# Patient Record
Sex: FEMALE | Race: WHITE | NOT HISPANIC OR LATINO | Employment: STUDENT | URBAN - METROPOLITAN AREA
[De-identification: names, ages, dates, MRNs, and addresses within clinical notes are randomized per-mention and may not be internally consistent; named-entity substitution may affect disease eponyms.]

---

## 2022-04-07 ENCOUNTER — HOSPITAL ENCOUNTER (EMERGENCY)
Facility: OTHER | Age: 22
Discharge: HOME | End: 2022-04-07
Attending: EMERGENCY MEDICINE
Payer: COMMERCIAL

## 2022-04-07 VITALS
OXYGEN SATURATION: 100 % | SYSTOLIC BLOOD PRESSURE: 110 MMHG | TEMPERATURE: 98 F | DIASTOLIC BLOOD PRESSURE: 68 MMHG | BODY MASS INDEX: 20.09 KG/M2 | RESPIRATION RATE: 16 BRPM | HEART RATE: 68 BPM | WEIGHT: 125 LBS | HEIGHT: 66 IN

## 2022-04-07 DIAGNOSIS — R55 SYNCOPE: ICD-10-CM

## 2022-04-07 DIAGNOSIS — R10.2 ACUTE PELVIC PAIN: ICD-10-CM

## 2022-04-07 DIAGNOSIS — N83.201 RIGHT OVARIAN CYST: Primary | ICD-10-CM

## 2022-04-07 DIAGNOSIS — R55 VASOVAGAL SYNCOPE: ICD-10-CM

## 2022-04-07 LAB
ALBUMIN SERPL BCP-MCNC: 4.5 G/DL (ref 3.5–5.2)
ALP SERPL-CCNC: 64 U/L (ref 55–135)
ALT SERPL W/O P-5'-P-CCNC: 13 U/L (ref 10–44)
ANION GAP SERPL CALC-SCNC: 12 MMOL/L (ref 8–16)
AST SERPL-CCNC: 26 U/L (ref 10–40)
B-HCG UR QL: NEGATIVE
BACTERIA #/AREA URNS HPF: ABNORMAL /HPF
BASOPHILS # BLD AUTO: 0.06 K/UL (ref 0–0.2)
BASOPHILS NFR BLD: 0.6 % (ref 0–1.9)
BILIRUB SERPL-MCNC: 0.7 MG/DL (ref 0.1–1)
BILIRUB UR QL STRIP: NEGATIVE
BUN SERPL-MCNC: 8 MG/DL (ref 6–20)
CALCIUM SERPL-MCNC: 9.7 MG/DL (ref 8.7–10.5)
CHLORIDE SERPL-SCNC: 105 MMOL/L (ref 95–110)
CLARITY UR: CLEAR
CO2 SERPL-SCNC: 20 MMOL/L (ref 23–29)
COLOR UR: YELLOW
CREAT SERPL-MCNC: 0.8 MG/DL (ref 0.5–1.4)
CTP QC/QA: YES
DIFFERENTIAL METHOD: NORMAL
EOSINOPHIL # BLD AUTO: 0.1 K/UL (ref 0–0.5)
EOSINOPHIL NFR BLD: 0.6 % (ref 0–8)
ERYTHROCYTE [DISTWIDTH] IN BLOOD BY AUTOMATED COUNT: 12.1 % (ref 11.5–14.5)
EST. GFR  (AFRICAN AMERICAN): >60 ML/MIN/1.73 M^2
EST. GFR  (NON AFRICAN AMERICAN): >60 ML/MIN/1.73 M^2
GLUCOSE SERPL-MCNC: 81 MG/DL (ref 70–110)
GLUCOSE UR QL STRIP: NEGATIVE
HCT VFR BLD AUTO: 44.2 % (ref 37–48.5)
HCV AB SERPL QL IA: NEGATIVE
HGB BLD-MCNC: 15.2 G/DL (ref 12–16)
HGB UR QL STRIP: NEGATIVE
HIV 1+2 AB+HIV1 P24 AG SERPL QL IA: NEGATIVE
IMM GRANULOCYTES # BLD AUTO: 0.01 K/UL (ref 0–0.04)
IMM GRANULOCYTES NFR BLD AUTO: 0.1 % (ref 0–0.5)
KETONES UR QL STRIP: NEGATIVE
LEUKOCYTE ESTERASE UR QL STRIP: ABNORMAL
LYMPHOCYTES # BLD AUTO: 2 K/UL (ref 1–4.8)
LYMPHOCYTES NFR BLD: 20.5 % (ref 18–48)
MCH RBC QN AUTO: 30.6 PG (ref 27–31)
MCHC RBC AUTO-ENTMCNC: 34.4 G/DL (ref 32–36)
MCV RBC AUTO: 89 FL (ref 82–98)
MICROSCOPIC COMMENT: ABNORMAL
MONOCYTES # BLD AUTO: 0.6 K/UL (ref 0.3–1)
MONOCYTES NFR BLD: 6 % (ref 4–15)
NEUTROPHILS # BLD AUTO: 6.9 K/UL (ref 1.8–7.7)
NEUTROPHILS NFR BLD: 72.2 % (ref 38–73)
NITRITE UR QL STRIP: NEGATIVE
NRBC BLD-RTO: 0 /100 WBC
PH UR STRIP: 8 [PH] (ref 5–8)
PLATELET # BLD AUTO: 291 K/UL (ref 150–450)
PMV BLD AUTO: 9.3 FL (ref 9.2–12.9)
POTASSIUM SERPL-SCNC: 4.5 MMOL/L (ref 3.5–5.1)
PROT SERPL-MCNC: 8.2 G/DL (ref 6–8.4)
PROT UR QL STRIP: NEGATIVE
RBC # BLD AUTO: 4.97 M/UL (ref 4–5.4)
SODIUM SERPL-SCNC: 137 MMOL/L (ref 136–145)
SP GR UR STRIP: 1.01 (ref 1–1.03)
SQUAMOUS #/AREA URNS HPF: 4 /HPF
URN SPEC COLLECT METH UR: ABNORMAL
UROBILINOGEN UR STRIP-ACNC: NEGATIVE EU/DL
WBC # BLD AUTO: 9.56 K/UL (ref 3.9–12.7)
WBC #/AREA URNS HPF: 7 /HPF (ref 0–5)

## 2022-04-07 PROCEDURE — 81025 URINE PREGNANCY TEST: CPT | Performed by: EMERGENCY MEDICINE

## 2022-04-07 PROCEDURE — 96374 THER/PROPH/DIAG INJ IV PUSH: CPT

## 2022-04-07 PROCEDURE — 99285 EMERGENCY DEPT VISIT HI MDM: CPT | Mod: 25

## 2022-04-07 PROCEDURE — 96361 HYDRATE IV INFUSION ADD-ON: CPT

## 2022-04-07 PROCEDURE — 63600175 PHARM REV CODE 636 W HCPCS: Performed by: EMERGENCY MEDICINE

## 2022-04-07 PROCEDURE — 80053 COMPREHEN METABOLIC PANEL: CPT | Performed by: EMERGENCY MEDICINE

## 2022-04-07 PROCEDURE — 85025 COMPLETE CBC W/AUTO DIFF WBC: CPT | Performed by: EMERGENCY MEDICINE

## 2022-04-07 PROCEDURE — 93010 EKG 12-LEAD: ICD-10-PCS | Mod: ,,, | Performed by: INTERNAL MEDICINE

## 2022-04-07 PROCEDURE — 87389 HIV-1 AG W/HIV-1&-2 AB AG IA: CPT | Performed by: EMERGENCY MEDICINE

## 2022-04-07 PROCEDURE — 86803 HEPATITIS C AB TEST: CPT | Performed by: EMERGENCY MEDICINE

## 2022-04-07 PROCEDURE — 93005 ELECTROCARDIOGRAM TRACING: CPT

## 2022-04-07 PROCEDURE — 81000 URINALYSIS NONAUTO W/SCOPE: CPT | Performed by: EMERGENCY MEDICINE

## 2022-04-07 PROCEDURE — 93010 ELECTROCARDIOGRAM REPORT: CPT | Mod: ,,, | Performed by: INTERNAL MEDICINE

## 2022-04-07 PROCEDURE — 25000003 PHARM REV CODE 250: Performed by: EMERGENCY MEDICINE

## 2022-04-07 RX ORDER — KETOROLAC TROMETHAMINE 30 MG/ML
10 INJECTION, SOLUTION INTRAMUSCULAR; INTRAVENOUS
Status: COMPLETED | OUTPATIENT
Start: 2022-04-07 | End: 2022-04-07

## 2022-04-07 RX ORDER — IBUPROFEN 800 MG/1
800 TABLET ORAL EVERY 6 HOURS PRN
Qty: 30 TABLET | Refills: 0 | Status: SHIPPED | OUTPATIENT
Start: 2022-04-07 | End: 2022-12-09 | Stop reason: ALTCHOICE

## 2022-04-07 RX ORDER — TRAMADOL HYDROCHLORIDE 50 MG/1
50 TABLET ORAL EVERY 6 HOURS PRN
Qty: 12 TABLET | Refills: 0 | Status: SHIPPED | OUTPATIENT
Start: 2022-04-07 | End: 2022-12-09 | Stop reason: ALTCHOICE

## 2022-04-07 RX ADMIN — SODIUM CHLORIDE 1000 ML: 0.9 INJECTION, SOLUTION INTRAVENOUS at 01:04

## 2022-04-07 RX ADMIN — KETOROLAC TROMETHAMINE 10 MG: 30 INJECTION, SOLUTION INTRAMUSCULAR at 04:04

## 2022-04-07 NOTE — Clinical Note
"Michell Hawkinsa" Clary was seen and treated in our emergency department on 4/7/2022.  She may return to school on 04/11/2022.      If you have any questions or concerns, please don't hesitate to call.      Vanesa Collazo LPN"

## 2022-04-07 NOTE — ED NOTES
"Patient identifiers verified and correct for   C/C: pt suffers from vasovagal and fainted today 1100 am in her dorm. Episode was witnessed by a friend, lasted for "only a couple of seconds." pt is aaaox4, on room air, tracking, endorsing minimal pain 2/10 lower abdominal pain with suspicion for UTI- denies dysuria/urgency/frequency  APPEARANCE: awake and alert in NAD.  SKIN: warm, dry and intact. No breakdown or bruising.  MUSCULOSKELETAL: Patient moving all extremities spontaneously, no obvious swelling or deformities noted. Ambulates independently.  RESPIRATORY: Denies shortness of breath.Respirations unlabored.   CARDIAC: Denies CP,  distal pulses; no peripheral edema  ABDOMEN: denies abdominal pain and n/v/d   : voids spontaneously, denies difficulty  Neurologic: AAO x 4; follows commands equal strength in all extremities; denies numbness/tingling. Denies dizziness   "

## 2022-04-07 NOTE — ED PROVIDER NOTES
"Encounter Date: 4/7/2022    SCRIBE #1 NOTE: I, Viji Harding, am scribing for, and in the presence of, Kd Starks II, MD.       History     Chief Complaint   Patient presents with    Abdominal Pain     C/o "bladder pain" (lower, middle abdomen) since this morning. Pt denied any difficulty/pain upon urinating but did state that she passed out while walking back from bathroom this morning. "Clammy, lightheaded, dizzy." LOC was witnessed. Currently has IUD in place. Denied N/V/D at present moment.      Time seen by provider: 12:44 PM    This is a 21 y.o. female who presents with complaint of abdominal pain onset this morning. Patient reports waking up this morning to urinate and feeling dizzy and near syncope. When walking back to her bed she experienced a syncope episode. Of note patient reports having a history of syncope when having her blood drawn. She states her dizziness has resolved but is still currently experiencing abdominal pain. Denies smoking, drug, or alcohol use. No hematuria or dysuria. Patient states her last menstrual period was 2 weeks ago and has an IUD in place. This is the extent of the patient's complaints at this time. Per EMS her glucose levels were 99 mg/dL.    The history is provided by the patient and the EMS personnel.     Review of patient's allergies indicates:  No Known Allergies  No past medical history on file.  No past surgical history on file.  No family history on file.     Review of Systems   Constitutional: Negative for fever.   HENT: Negative for sore throat.    Respiratory: Negative for shortness of breath.    Cardiovascular: Negative for chest pain.   Gastrointestinal: Positive for abdominal pain. Negative for nausea.   Genitourinary: Negative for dysuria and hematuria.   Musculoskeletal: Negative for back pain.   Skin: Negative for rash.   Neurological: Positive for dizziness (resolved) and syncope.   Hematological: Does not bruise/bleed easily.       Physical Exam "     Initial Vitals [04/07/22 1212]   BP Pulse Resp Temp SpO2   120/66 64 14 97.7 °F (36.5 °C) 98 %      MAP       --         Physical Exam    Nursing note and vitals reviewed.  Constitutional: She appears well-developed and well-nourished.   HENT:   Head: Normocephalic.   Mouth/Throat: Oropharynx is clear and moist.   Dry mucus membranes.   Eyes: Conjunctivae and EOM are normal.   No pallor or icterus.   Neck: Neck supple.   Normal range of motion.  Cardiovascular: Normal rate, regular rhythm and normal heart sounds.   No murmur heard.  Pulmonary/Chest: Breath sounds normal. No respiratory distress.   Abdominal: There is abdominal tenderness (bilateral lower quadrant and suprapubic region).   No focal tenderness at McBurney's point. Negative Marinelli's sign. There is no rebound and no guarding.   Musculoskeletal:         General: No tenderness or edema. Normal range of motion.      Cervical back: Normal range of motion and neck supple.     Neurological: She is alert and oriented to person, place, and time. She has normal strength.   Skin: Skin is warm and dry. Capillary refill takes less than 2 seconds. No rash noted.   Psychiatric: She has a normal mood and affect.         ED Course   Procedures  Labs Reviewed   COMPREHENSIVE METABOLIC PANEL - Abnormal; Notable for the following components:       Result Value    CO2 20 (*)     All other components within normal limits   URINALYSIS, REFLEX TO URINE CULTURE - Abnormal; Notable for the following components:    Leukocytes, UA Trace (*)     All other components within normal limits    Narrative:     Specimen Source->Urine   URINALYSIS MICROSCOPIC - Abnormal; Notable for the following components:    WBC, UA 7 (*)     All other components within normal limits    Narrative:     Specimen Source->Urine   CBC W/ AUTO DIFFERENTIAL   HIV 1 / 2 ANTIBODY    Narrative:     Release to patient->Immediate   HEPATITIS C ANTIBODY    Narrative:     Release to patient->Immediate   POCT  URINE PREGNANCY     EKG Readings: (Independently Interpreted)   Rhythm: Normal Sinus Rhythm. Heart Rate: 60.   Normal QT interval. No ischemia. No arrhythmia.     ECG Results          EKG 12-lead (In process)  Result time 04/08/22 15:15:27    In process by Interface, Lab In [facility] (04/08/22 15:15:27)                 Narrative:    Test Reason : R55,    Vent. Rate : 061 BPM     Atrial Rate : 061 BPM     P-R Int : 174 ms          QRS Dur : 086 ms      QT Int : 430 ms       P-R-T Axes : 062 095 068 degrees     QTc Int : 432 ms    Normal sinus rhythm with sinus arrhythmia  Rightward axis  Borderline Abnormal ECG      Referred By: [email or web address]   SELF           Confirmed By:                             Imaging Results          US Pelvis Comp with Transvag NON-OB (xpd (Final result)  Result time 04/07/22 17:33:29    Final result by Bautista Mcdonough MD (04/07/22 17:33:29)                 Impression:      1. Right ovarian 2.5 cm cyst, possibly ruptured cyst with free fluid seen in the pelvis.  2. IUD appears in appropriate position.      Electronically signed by: Bautista Mcdonough MD  Date:    04/07/2022  Time:    17:33             Narrative:    EXAMINATION:  US PELVIS COMP WITH TRANSVAG NON-OB (XPD)    CLINICAL HISTORY:  pelvi pain;    TECHNIQUE:  Transabdominal sonography of the pelvis was performed, followed by transvaginal sonography to better evaluate the uterus and ovaries.    COMPARISON:  None.    FINDINGS:  The uterus measures 5.8 x 3.5 x 4.5 cm. Uterine parenchyma is homogeneous without evidence for masses.  Endometrial echo complex appears normal in thickness.  IUD of is visualized and appears in appropriate position.    The right ovary measures 5.8 x 3.3 x 4.6 cm. The left ovary measures 2.9 x 1.7 x 1.5 cm. Arterial and venous flow are preserved bilaterally.  Follicles are seen in both ovaries. There is right ovarian cyst, possibly ruptured cyst seen measuring at least 2.5 x 1.6 x 2.0 cm.  Free fluid is seen in the  pelvis which appears greater than normal physiologic volume.                                 Medications   sodium chloride 0.9% bolus 1,000 mL (0 mLs Intravenous Stopped 4/7/22 1515)   ketorolac injection 9.999 mg (9.999 mg Intravenous Given 4/7/22 1603)     Medical Decision Making:   History:   Old Medical Records: I decided to obtain old medical records.  Independently Interpreted Test(s):   I have ordered and independently interpreted EKG Reading(s) - see prior notes  Clinical Tests:   Lab Tests: Ordered and Reviewed  Medical Tests: Ordered and Reviewed    Healthy female, presents with sudden onset of pain in the suprapubic region this morning.  Felt similar to urine tract infection however has not noticed actual burning or pain with the active urination.  When the pain was severe she began to feel lightheaded and had a brief syncopal episode.  She states however that she has had multiple similar episodes usually after blood draws or similar procedures.  Her mother presented to the emergency department later in the stay in confirms that she has a history of vagal episodes.  On exam she did have tenderness throughout the lower abdomen but not focally at McBurney's point.  The sudden onset of severe pain, lack of fever or white count suggesting against acute appendicitis.  Urinalysis did not suggest UTI or kidney stone.  There was a small amount of white blood cells and bacteria but also some squamous cells.  Again she does not have dysuria frequency etc.  pelvic ultrasound was performed due to the sudden onset of pain, this does show a right-sided ovarian cyst, possibly with recent rupture explaining the pain throughout the pelvic region.  Hemoglobin is normal pain is well controlled after anti-inflammatories.  Vital signs remained stable.  Recommend follow-up with either out our OBGYN clinic or her OBGYN upon return to [place] where she lives when not in school.  Return precautions discussed for the interim               Scribe Attestation:   Scribe #1: I performed the above scribed service and the documentation accurately describes the services I performed. I attest to the accuracy of the note.               Physician Attestation for Scribe: I, TLH, reviewed documentation as scribed in my presence, which is both accurate and complete.  Clinical Impression:   Final diagnoses:  [R55] Syncope  [N83.201] Right ovarian cyst (Primary)  [R10.2] Acute pelvic pain  [R55] Vasovagal syncope          ED Disposition Condition    Discharge Stable        ED Prescriptions     Medication Sig Dispense Start Date End Date Auth. Provider    ibuprofen (ADVIL,MOTRIN) 800 MG tablet Take 1 tablet (800 mg total) by mouth every 6 (six) hours as needed for Pain. 30 tablet 4/7/2022  Kd Starks II, MD    traMADoL (ULTRAM) 50 mg tablet Take 1 tablet (50 mg total) by mouth every 6 (six) hours as needed for Pain. 12 tablet 4/7/2022  Kd Starks II, MD        Follow-up Information     Follow up With Specialties Details Why Contact Info Additional Information    [facility] and Gynecology In 1 week  [address] [facility] [address] [phone] OB GYN - [facility], 4th Floor, [address] Please park in [address] and use [place] elevators           Kd Starks II, MD  04/08/22 2817

## 2022-04-07 NOTE — Clinical Note
"Michell Hawkinsjimbo Means was seen and treated in our emergency department on 4/7/2022.  She may return to school on 04/09/2022.      If you have any questions or concerns, please don't hesitate to call.      ROQUE Mccann RN"

## 2022-09-27 ENCOUNTER — OFFICE VISIT (OUTPATIENT)
Dept: URGENT CARE | Facility: CLINIC | Age: 22
End: 2022-09-27
Payer: COMMERCIAL

## 2022-09-27 VITALS
OXYGEN SATURATION: 97 % | TEMPERATURE: 97 F | DIASTOLIC BLOOD PRESSURE: 74 MMHG | HEART RATE: 75 BPM | WEIGHT: 130 LBS | RESPIRATION RATE: 18 BRPM | HEIGHT: 66 IN | BODY MASS INDEX: 20.89 KG/M2 | SYSTOLIC BLOOD PRESSURE: 109 MMHG

## 2022-09-27 DIAGNOSIS — H60.331 ACUTE SWIMMER'S EAR OF RIGHT SIDE: Primary | ICD-10-CM

## 2022-09-27 PROCEDURE — 99203 OFFICE O/P NEW LOW 30 MIN: CPT | Mod: S$GLB,,,

## 2022-09-27 PROCEDURE — 99203 PR OFFICE/OUTPT VISIT, NEW, LEVL III, 30-44 MIN: ICD-10-PCS | Mod: S$GLB,,,

## 2022-09-27 PROCEDURE — 3078F PR MOST RECENT DIASTOLIC BLOOD PRESSURE < 80 MM HG: ICD-10-PCS | Mod: CPTII,S$GLB,,

## 2022-09-27 PROCEDURE — 3008F PR BODY MASS INDEX (BMI) DOCUMENTED: ICD-10-PCS | Mod: CPTII,S$GLB,,

## 2022-09-27 PROCEDURE — 3074F SYST BP LT 130 MM HG: CPT | Mod: CPTII,S$GLB,,

## 2022-09-27 PROCEDURE — 1159F MED LIST DOCD IN RCRD: CPT | Mod: CPTII,S$GLB,,

## 2022-09-27 PROCEDURE — 3078F DIAST BP <80 MM HG: CPT | Mod: CPTII,S$GLB,,

## 2022-09-27 PROCEDURE — 3008F BODY MASS INDEX DOCD: CPT | Mod: CPTII,S$GLB,,

## 2022-09-27 PROCEDURE — 3074F PR MOST RECENT SYSTOLIC BLOOD PRESSURE < 130 MM HG: ICD-10-PCS | Mod: CPTII,S$GLB,,

## 2022-09-27 PROCEDURE — 1160F RVW MEDS BY RX/DR IN RCRD: CPT | Mod: CPTII,S$GLB,,

## 2022-09-27 PROCEDURE — 1160F PR REVIEW ALL MEDS BY PRESCRIBER/CLIN PHARMACIST DOCUMENTED: ICD-10-PCS | Mod: CPTII,S$GLB,,

## 2022-09-27 PROCEDURE — 1159F PR MEDICATION LIST DOCUMENTED IN MEDICAL RECORD: ICD-10-PCS | Mod: CPTII,S$GLB,,

## 2022-09-27 RX ORDER — HYDROCORTISONE AND ACETIC ACID 20.75; 10.375 MG/ML; MG/ML
SOLUTION AURICULAR (OTIC)
Qty: 10 ML | Refills: 0 | Status: SHIPPED | OUTPATIENT
Start: 2022-09-27 | End: 2022-12-09 | Stop reason: ALTCHOICE

## 2022-09-27 NOTE — PATIENT INSTRUCTIONS
PLEASE READ ALL DISCHARGE INSTRUCTIONS    Ear Infection:  Take full course of antibiotic ear drops as prescribed.  Avoid cleaning ears with any foreign objects; use over the counter Debrox as directed.   - take Zyrtec D daily for pressure and congestion  Follow up with your PCP in 2-3 of initiating antibiotic ear drops or sooner for no improvement in symptoms  Follow up in the ER for any worsening of symptoms such as new fever, increasing ear pain, neck stiffness, etc.  If you smoke, stop smoking.    - Acetaminophen (tylenol) or Ibuprofen (advil,motrin) as directed as needed for fever/pain. Avoid tylenol if you have a history of liver disease. Do not take ibuprofen if you have a history of GI bleeding, kidney disease, or if you take blood thinners.     - You must understand that you have received an Urgent Care treatment only and that you may be released before all of your medical problems are known or treated.   - You, the patient, will arrange for follow up care as instructed.   - If your condition worsens or fails to improve we recommend that you receive another evaluation at the ER immediately or contact your PCP to discuss your concerns or return here.   - Follow up with your PCP or specialty clinic as directed in the next 1-2 weeks if not improved or as needed.  You can call [phone] to schedule an appointment with the appropriate provider.

## 2022-09-27 NOTE — PROGRESS NOTES
"Subjective:       Patient ID: Michell Means is a 21 y.o. female.    Vitals:  height is 5' 6" (1.676 m) and weight is 59 kg (130 lb). Her temperature is 97.3 °F (36.3 °C). Her blood pressure is 109/74 and her pulse is 75. Her respiration is 18 and oxygen saturation is 97%.     Chief Complaint: Otalgia    Patient has had 1-2 days of ear pressure/ pain in both ears. She noticed these symptoms after taking a shower    Otalgia   There is pain in both ears. This is a new problem. The current episode started yesterday. There has been no fever. The pain is at a severity of 4/10. Pertinent negatives include no coughing, ear discharge, headaches, hearing loss, neck pain, rhinorrhea, sore throat or vomiting. She has tried nothing for the symptoms.     Constitution: Negative for chills and fever.   HENT:  Positive for ear pain. Negative for ear discharge, tinnitus, hearing loss, sinus pain, sinus pressure and sore throat.    Neck: Negative for neck pain.   Respiratory:  Negative for cough.    Gastrointestinal:  Negative for nausea and vomiting.   Neurological:  Negative for headaches.     Objective:      Physical Exam   Constitutional: She is oriented to person, place, and time.  Non-toxic appearance. No distress.   HENT:   Head: Normocephalic and atraumatic.   Ears:   Right Ear: There is tenderness. Tympanic membrane is erythematous.   Left Ear: Tympanic membrane, external ear and ear canal normal.   Nose: Nose normal.   Erythematous right EAC with erythematous TM- no retraction or bulging      Comments: Erythematous right EAC with erythematous TM- no retraction or bulging  Eyes: Conjunctivae are normal. Extraocular movement intact   Cardiovascular: Normal rate and normal pulses.   Pulmonary/Chest: Effort normal. No respiratory distress.   Musculoskeletal: Normal range of motion.         General: Normal range of motion.   Neurological: She is alert, oriented to person, place, and time and at baseline.   Skin: Skin is warm and " dry.   Nursing note and vitals reviewed.      Assessment:       1. Acute swimmer's ear of right side          Plan:         Acute swimmer's ear of right side  -     acetic acid-hydrocortisone (VOSOL-HC) otic solution; Insert saturated wick of cotton into right ear; keep moist for 24 hours by adding 3 drops every 6 hours; remove wick after 24 hours and instill 5 drops 3 times daily for 6 days.  Dispense: 10 mL; Refill: 0       Patient Instructions   PLEASE READ ALL DISCHARGE INSTRUCTIONS    Ear Infection:  Take full course of antibiotic ear drops as prescribed.  Avoid cleaning ears with any foreign objects; use over the counter Debrox as directed.   - take Zyrtec D daily for pressure and congestion  Follow up with your PCP in 2-3 of initiating antibiotic ear drops or sooner for no improvement in symptoms  Follow up in the ER for any worsening of symptoms such as new fever, increasing ear pain, neck stiffness, etc.  If you smoke, stop smoking.    - Acetaminophen (tylenol) or Ibuprofen (advil,motrin) as directed as needed for fever/pain. Avoid tylenol if you have a history of liver disease. Do not take ibuprofen if you have a history of GI bleeding, kidney disease, or if you take blood thinners.     - You must understand that you have received an Urgent Care treatment only and that you may be released before all of your medical problems are known or treated.   - You, the patient, will arrange for follow up care as instructed.   - If your condition worsens or fails to improve we recommend that you receive another evaluation at the ER immediately or contact your PCP to discuss your concerns or return here.   - Follow up with your PCP or specialty clinic as directed in the next 1-2 weeks if not improved or as needed.  You can call [phone] to schedule an appointment with the appropriate provider.

## 2022-09-27 NOTE — LETTER
September 27, 2022      Urgent Care - [address] [address]  Phone: [phone]  Fax: [phone]       Patient: Michell Means   YOB: 2000  Date of Visit: 09/27/2022    To Whom It May Concern:    Emy Means  was at [facility] on 09/27/2022. The patient may return to work/school on 9/27/2022 with no restrictions. If you have any questions or concerns, or if I can be of further assistance, please do not hesitate to contact me.    Sincerely,      Cheryl Hinton PA-C

## 2022-12-09 ENCOUNTER — OFFICE VISIT (OUTPATIENT)
Dept: URGENT CARE | Facility: CLINIC | Age: 22
End: 2022-12-09
Payer: COMMERCIAL

## 2022-12-09 VITALS
BODY MASS INDEX: 20.89 KG/M2 | OXYGEN SATURATION: 99 % | SYSTOLIC BLOOD PRESSURE: 133 MMHG | WEIGHT: 130 LBS | DIASTOLIC BLOOD PRESSURE: 91 MMHG | TEMPERATURE: 98 F | HEART RATE: 99 BPM | RESPIRATION RATE: 18 BRPM | HEIGHT: 66 IN

## 2022-12-09 DIAGNOSIS — F41.9 ANXIETY: ICD-10-CM

## 2022-12-09 DIAGNOSIS — R07.9 CHEST PAIN, UNSPECIFIED TYPE: Primary | ICD-10-CM

## 2022-12-09 PROCEDURE — 93010 ELECTROCARDIOGRAM REPORT: CPT | Mod: S$GLB,,, | Performed by: INTERNAL MEDICINE

## 2022-12-09 PROCEDURE — 93010 EKG 12-LEAD: ICD-10-PCS | Mod: S$GLB,,, | Performed by: INTERNAL MEDICINE

## 2022-12-09 PROCEDURE — 93005 EKG 12-LEAD: ICD-10-PCS | Mod: S$GLB,,, | Performed by: SURGERY

## 2022-12-09 PROCEDURE — 1159F MED LIST DOCD IN RCRD: CPT | Mod: CPTII,S$GLB,, | Performed by: SURGERY

## 2022-12-09 PROCEDURE — 1159F PR MEDICATION LIST DOCUMENTED IN MEDICAL RECORD: ICD-10-PCS | Mod: CPTII,S$GLB,, | Performed by: SURGERY

## 2022-12-09 PROCEDURE — 93005 ELECTROCARDIOGRAM TRACING: CPT | Mod: S$GLB,,, | Performed by: SURGERY

## 2022-12-09 PROCEDURE — 3008F PR BODY MASS INDEX (BMI) DOCUMENTED: ICD-10-PCS | Mod: CPTII,S$GLB,, | Performed by: SURGERY

## 2022-12-09 PROCEDURE — 3080F PR MOST RECENT DIASTOLIC BLOOD PRESSURE >= 90 MM HG: ICD-10-PCS | Mod: CPTII,S$GLB,, | Performed by: SURGERY

## 2022-12-09 PROCEDURE — 1160F RVW MEDS BY RX/DR IN RCRD: CPT | Mod: CPTII,S$GLB,, | Performed by: SURGERY

## 2022-12-09 PROCEDURE — 3080F DIAST BP >= 90 MM HG: CPT | Mod: CPTII,S$GLB,, | Performed by: SURGERY

## 2022-12-09 PROCEDURE — 1160F PR REVIEW ALL MEDS BY PRESCRIBER/CLIN PHARMACIST DOCUMENTED: ICD-10-PCS | Mod: CPTII,S$GLB,, | Performed by: SURGERY

## 2022-12-09 PROCEDURE — 3008F BODY MASS INDEX DOCD: CPT | Mod: CPTII,S$GLB,, | Performed by: SURGERY

## 2022-12-09 PROCEDURE — 3075F SYST BP GE 130 - 139MM HG: CPT | Mod: CPTII,S$GLB,, | Performed by: SURGERY

## 2022-12-09 PROCEDURE — 99214 PR OFFICE/OUTPT VISIT, EST, LEVL IV, 30-39 MIN: ICD-10-PCS | Mod: S$GLB,,, | Performed by: SURGERY

## 2022-12-09 PROCEDURE — 99214 OFFICE O/P EST MOD 30 MIN: CPT | Mod: S$GLB,,, | Performed by: SURGERY

## 2022-12-09 PROCEDURE — 3075F PR MOST RECENT SYSTOLIC BLOOD PRESS GE 130-139MM HG: ICD-10-PCS | Mod: CPTII,S$GLB,, | Performed by: SURGERY

## 2022-12-09 RX ORDER — HYDROXYZINE HYDROCHLORIDE 25 MG/1
25-50 TABLET, FILM COATED ORAL 4 TIMES DAILY PRN
Qty: 20 TABLET | Refills: 0 | Status: SHIPPED | OUTPATIENT
Start: 2022-12-09 | End: 2022-12-14

## 2022-12-09 NOTE — PROGRESS NOTES
"Subjective:       Patient ID: Michell Means is a 22 y.o. female.    Vitals:  height is 5' 6" (1.676 m) and weight is 59 kg (130 lb). Her temperature is 98.1 °F (36.7 °C). Her blood pressure is 133/91 (abnormal) and her pulse is 99. Her respiration is 18 and oxygen saturation is 99%.     Chief Complaint: Chest Pain    Pateint presents with c.o chest pain for 1 week. The pain has been constant and paired with left arm numbness. Patient states she found out really bad news prior to onset of sxs. No first degree relative with heart disease. No uri sxs. Denies fever.      She reports known history of anxiety, diagnosed when she was living in [place]. She has not been on medication for it before. She found out last week her boyfriend of 3 years cheated on her as well as a close friend . She denies suicidal ideation. She reports she goes to bed anxious and wakes up anxious, which has never happened before. She reports left arm numbness as well as burning chest pain.      Chest Pain   This is a new problem. Episode onset: 1 week. The onset quality is sudden. The problem occurs constantly. The problem has been unchanged. The pain is present in the substernal region. The quality of the pain is described as burning, crushing, dull and heavy. The pain does not radiate. Associated symptoms include palpitations, shortness of breath and vomiting. Pertinent negatives include no lower extremity edema or nausea. Associated with: stress. She has tried nothing for the symptoms.     Cardiovascular:  Positive for chest pain and palpitations.   Respiratory:  Positive for shortness of breath.    Gastrointestinal:  Positive for vomiting. Negative for nausea.     Objective:      Physical Exam   Constitutional: She is oriented to person, place, and time. She appears well-developed. She is cooperative.  Non-toxic appearance. She does not appear ill. No distress.   HENT:   Head: Normocephalic and atraumatic.   Ears:   Right Ear: Hearing, " tympanic membrane, external ear and ear canal normal.   Left Ear: Hearing, tympanic membrane, external ear and ear canal normal.   Nose: Nose normal. No mucosal edema, rhinorrhea or nasal deformity. No epistaxis. Right sinus exhibits no maxillary sinus tenderness and no frontal sinus tenderness. Left sinus exhibits no maxillary sinus tenderness and no frontal sinus tenderness.   Mouth/Throat: Uvula is midline, oropharynx is clear and moist and mucous membranes are normal. No trismus in the jaw. Normal dentition. No uvula swelling. No posterior oropharyngeal erythema.   Eyes: Conjunctivae and lids are normal. Right eye exhibits no discharge. Left eye exhibits no discharge. No scleral icterus.   Neck: Trachea normal and phonation normal. Neck supple.   Cardiovascular: Normal rate, regular rhythm, normal heart sounds and normal pulses.   Pulmonary/Chest: Effort normal and breath sounds normal. No respiratory distress.   Abdominal: Normal appearance and bowel sounds are normal. She exhibits no distension and no mass. Soft. There is no abdominal tenderness.   Musculoskeletal: Normal range of motion.         General: No deformity. Normal range of motion.   Neurological: She is alert and oriented to person, place, and time. She exhibits normal muscle tone. Coordination normal.   Skin: Skin is warm, dry, intact, not diaphoretic and not pale.   Psychiatric: Her speech is normal and behavior is normal. Judgment and thought content normal.   Nursing note and vitals reviewed.      Assessment:       1. Chest pain, unspecified type    2. Anxiety          Plan:         Chest pain, unspecified type  -     IN OFFICE EKG 12-LEAD (to Muse)  -     IN OFFICE EKG 12-LEAD (to Muse)    Anxiety  -     hydrOXYzine HCL (ATARAX) 25 MG tablet; Take 1-2 tablets (25-50 mg total) by mouth 4 (four) times daily as needed for Itching.  Dispense: 20 tablet; Refill: 0  -     Ambulatory referral/consult to Psychiatry       EKG: normal EKG, normal sinus  rhythm, there are no previous tracings available for comparison.    She is trying to get appt with counselor. Will go ahead and refer to psychiatry as well to luz elenaal for general anxiety/panic attacks

## 2022-12-09 NOTE — LETTER
December 9, 2022      Urgent Care - [place] [id number] [facility] [address]  Phone: [phone]  Fax: [phone]       Patient: Michell Means   YOB: 2000  Date of Visit: 12/09/2022    To Whom It May Concern:    Emy Means  was at [facility] on 12/09/2022. The patient may return to work/school on 12/10/2022 with no restrictions. If you have any questions or concerns, or if I can be of further assistance, please do not hesitate to contact me.    Sincerely,      Pebbles Meyer MD

## 2023-01-22 ENCOUNTER — OFFICE VISIT (OUTPATIENT)
Dept: URGENT CARE | Facility: CLINIC | Age: 23
End: 2023-01-22
Payer: COMMERCIAL

## 2023-01-22 VITALS
OXYGEN SATURATION: 98 % | TEMPERATURE: 101 F | DIASTOLIC BLOOD PRESSURE: 76 MMHG | HEART RATE: 103 BPM | BODY MASS INDEX: 20.89 KG/M2 | SYSTOLIC BLOOD PRESSURE: 111 MMHG | WEIGHT: 130 LBS | HEIGHT: 66 IN | RESPIRATION RATE: 16 BRPM

## 2023-01-22 DIAGNOSIS — R50.9 FEVER, UNSPECIFIED FEVER CAUSE: Primary | ICD-10-CM

## 2023-01-22 DIAGNOSIS — R59.0 SUBMANDIBULAR LYMPHADENOPATHY: ICD-10-CM

## 2023-01-22 DIAGNOSIS — J02.9 SORE THROAT: ICD-10-CM

## 2023-01-22 DIAGNOSIS — R13.10 PAINFUL SWALLOWING: ICD-10-CM

## 2023-01-22 LAB
CTP QC/QA: YES
CTP QC/QA: YES
HETEROPH AB SER QL: NEGATIVE
MOLECULAR STREP A: NEGATIVE

## 2023-01-22 PROCEDURE — 1160F PR REVIEW ALL MEDS BY PRESCRIBER/CLIN PHARMACIST DOCUMENTED: ICD-10-PCS | Mod: CPTII,S$GLB,, | Performed by: NURSE PRACTITIONER

## 2023-01-22 PROCEDURE — 99213 OFFICE O/P EST LOW 20 MIN: CPT | Mod: S$GLB,,, | Performed by: NURSE PRACTITIONER

## 2023-01-22 PROCEDURE — 87651 STREP A DNA AMP PROBE: CPT | Mod: QW,S$GLB,, | Performed by: NURSE PRACTITIONER

## 2023-01-22 PROCEDURE — 86308 POCT INFECTIOUS MONONUCLEOSIS: ICD-10-PCS | Mod: QW,S$GLB,, | Performed by: NURSE PRACTITIONER

## 2023-01-22 PROCEDURE — 87651 POCT STREP A MOLECULAR: ICD-10-PCS | Mod: QW,S$GLB,, | Performed by: NURSE PRACTITIONER

## 2023-01-22 PROCEDURE — 3074F SYST BP LT 130 MM HG: CPT | Mod: CPTII,S$GLB,, | Performed by: NURSE PRACTITIONER

## 2023-01-22 PROCEDURE — 99213 PR OFFICE/OUTPT VISIT, EST, LEVL III, 20-29 MIN: ICD-10-PCS | Mod: S$GLB,,, | Performed by: NURSE PRACTITIONER

## 2023-01-22 PROCEDURE — 3074F PR MOST RECENT SYSTOLIC BLOOD PRESSURE < 130 MM HG: ICD-10-PCS | Mod: CPTII,S$GLB,, | Performed by: NURSE PRACTITIONER

## 2023-01-22 PROCEDURE — 87081 CULTURE SCREEN ONLY: CPT | Performed by: NURSE PRACTITIONER

## 2023-01-22 PROCEDURE — 3008F PR BODY MASS INDEX (BMI) DOCUMENTED: ICD-10-PCS | Mod: CPTII,S$GLB,, | Performed by: NURSE PRACTITIONER

## 2023-01-22 PROCEDURE — 86308 HETEROPHILE ANTIBODY SCREEN: CPT | Mod: QW,S$GLB,, | Performed by: NURSE PRACTITIONER

## 2023-01-22 PROCEDURE — 3078F DIAST BP <80 MM HG: CPT | Mod: CPTII,S$GLB,, | Performed by: NURSE PRACTITIONER

## 2023-01-22 PROCEDURE — 3078F PR MOST RECENT DIASTOLIC BLOOD PRESSURE < 80 MM HG: ICD-10-PCS | Mod: CPTII,S$GLB,, | Performed by: NURSE PRACTITIONER

## 2023-01-22 PROCEDURE — 1160F RVW MEDS BY RX/DR IN RCRD: CPT | Mod: CPTII,S$GLB,, | Performed by: NURSE PRACTITIONER

## 2023-01-22 PROCEDURE — 1159F MED LIST DOCD IN RCRD: CPT | Mod: CPTII,S$GLB,, | Performed by: NURSE PRACTITIONER

## 2023-01-22 PROCEDURE — 3008F BODY MASS INDEX DOCD: CPT | Mod: CPTII,S$GLB,, | Performed by: NURSE PRACTITIONER

## 2023-01-22 PROCEDURE — 1159F PR MEDICATION LIST DOCUMENTED IN MEDICAL RECORD: ICD-10-PCS | Mod: CPTII,S$GLB,, | Performed by: NURSE PRACTITIONER

## 2023-01-22 RX ORDER — AZITHROMYCIN 250 MG/1
TABLET, FILM COATED ORAL
Qty: 6 TABLET | Refills: 0 | Status: SHIPPED | OUTPATIENT
Start: 2023-01-22 | End: 2023-01-27

## 2023-01-22 RX ORDER — ACETAMINOPHEN 500 MG
1000 TABLET ORAL
Status: COMPLETED | OUTPATIENT
Start: 2023-01-22 | End: 2023-01-22

## 2023-01-22 RX ADMIN — Medication 1000 MG: at 09:01

## 2023-01-22 NOTE — PROGRESS NOTES
"Subjective:       Patient ID: Michell Means is a 22 y.o. female.    Vitals:  height is 5' 6" (1.676 m) and weight is 59 kg (130 lb). Her temporal temperature is 101.1 °F (38.4 °C) (abnormal). Her blood pressure is 111/76 and her pulse is 103. Her respiration is 16 and oxygen saturation is 98%.     Chief Complaint: Sore Throat    Patient c/o sore throat and fever x2 days ago. 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      22 year old female presents to clinic with complaints of painful swallowing, fever, swollen glands, and headache x 2 days.      Sore Throat   This is a new problem. Episode onset: 2days ago. The problem has been gradually worsening. Neither side of throat is experiencing more pain than the other. The maximum temperature recorded prior to her arrival was 101 - 101.9 F. The pain is at a severity of 10/10. The pain is severe. Associated symptoms include headaches, swollen glands and trouble swallowing. Pertinent negatives include no abdominal pain, congestion, neck pain or vomiting. She has had no exposure to strep or mono. The treatment provided no relief.     Constitution: Positive for fatigue and fever. Negative for chills and sweating.   HENT:  Positive for sore throat and trouble swallowing. Negative for congestion and postnasal drip.    Neck: Positive for painful lymph nodes. Negative for neck pain and neck stiffness.   Gastrointestinal:  Negative for abdominal pain, nausea and vomiting.   Musculoskeletal:  Negative for muscle ache.   Skin:  Negative for erythema.   Neurological:  Positive for headaches.   Hematologic/Lymphatic: Positive for swollen lymph nodes.     Objective:      Physical Exam   Constitutional: She is oriented to person, place, and time. She appears well-developed. She appears ill.   HENT:   Head: Normocephalic and atraumatic. Head is without abrasion, without contusion and without laceration.   Ears:   Right Ear: External ear normal.   Left Ear: External ear " normal.   Nose: Nose normal.   Mouth/Throat: Mucous membranes are normal. Posterior oropharyngeal erythema present.   Eyes: Conjunctivae, EOM and lids are normal. Pupils are equal, round, and reactive to light.   Neck: Trachea normal and phonation normal. Neck supple.   Cardiovascular: Regular rhythm and normal heart sounds. Tachycardia present.   Pulmonary/Chest: Effort normal and breath sounds normal. No stridor. No respiratory distress.   Musculoskeletal: Normal range of motion.         General: Normal range of motion.   Lymphadenopathy:        Head (right side): Submandibular adenopathy present.        Head (left side): Submandibular adenopathy present.   Neurological: She is alert and oriented to person, place, and time.   Skin: Skin is warm, dry, intact and no rash. Capillary refill takes less than 2 seconds. No abrasion, No burn, No bruising, No erythema and No ecchymosis   Psychiatric: Her speech is normal and behavior is normal. Judgment and thought content normal.   Nursing note and vitals reviewed.      Assessment:       1. Fever, unspecified fever cause    2. Sore throat    3. Submandibular lymphadenopathy    4. Painful swallowing        Results for orders placed or performed in visit on 01/22/23   POCT Strep A, Molecular   Result Value Ref Range    Molecular Strep A, POC Negative Negative     Acceptable Yes    POCT Infectious mononucleosis antibody   Result Value Ref Range    Monospot Negative Negative     Acceptable Yes       Plan:         Fever, unspecified fever cause  -     acetaminophen tablet 1,000 mg  -     POCT Infectious mononucleosis antibody    Sore throat  -     POCT Strep A, Molecular  -     POCT Infectious mononucleosis antibody  -     CULTURE, STREP A,  THROAT  -     azithromycin (Z-ROME) 250 MG tablet; Take 2 tablets by mouth on day 1; Take 1 tablet by mouth on days 2-5  Dispense: 6 tablet; Refill: 0    Submandibular lymphadenopathy  -     POCT Infectious  mononucleosis antibody    Painful swallowing  -     diphenhydrAMINE-aluminum-magnesium hydroxide-simethicone-LIDOcaine HCl 2%; Swish and spit 15 mLs every 4 (four) hours as needed (throat pain).  Dispense: 120 each; Refill: 0         Patient Instructions   To help ease a sore throat you can:  Use a sore throat spray.  Suck on hard candy or throat lozenges.  Gargle with warm saltwater a few times each day. Mix of 1/4 teaspoon (1.25 grams) salt in 8 ounces (240 mL) of warm water.  Use a cool mist humidifier to help you breathe easier.  You may want to take medicine like acetaminophen, ibuprofen, or naproxen for pain.    If you were prescribed antibiotics, please take them to completion.    Please follow up with your primary care doctor or specialist as needed.    Please return here or go to the Emergency Department for any concerns or worsening of condition.    If you  smoke, please stop smoking.

## 2023-01-22 NOTE — LETTER
January 22, 2023      [facility]gent [facility] - [facility]  [address] [address]  Phone: [phone]  Fax: [phone]       Patient: Michell Means   YOB: 2000  Date of Visit: 01/22/2023    To Whom It May Concern:    Emy Means  was at [facility] on 01/22/2023. The patient may return to work/school on 01/24/2023 with restrictions. If you have any questions or concerns, or if I can be of further assistance, please do not hesitate to contact me.    Sincerely,    Rosalva Fuller NP

## 2023-01-22 NOTE — PATIENT INSTRUCTIONS
To help ease a sore throat you can:  Use a sore throat spray.  Suck on hard candy or throat lozenges.  Gargle with warm saltwater a few times each day. Mix of 1/4 teaspoon (1.25 grams) salt in 8 ounces (240 mL) of warm water.  Use a cool mist humidifier to help you breathe easier.  You may want to take medicine like acetaminophen, ibuprofen, or naproxen for pain.    If you were prescribed antibiotics, please take them to completion.    Please follow up with your primary care doctor or specialist as needed.    Please return here or go to the Emergency Department for any concerns or worsening of condition.    If you  smoke, please stop smoking.

## 2023-01-25 ENCOUNTER — TELEPHONE (OUTPATIENT)
Dept: URGENT CARE | Facility: CLINIC | Age: 23
End: 2023-01-25
Payer: COMMERCIAL

## 2023-01-25 LAB — BACTERIA THROAT CULT: NORMAL

## 2023-01-25 NOTE — TELEPHONE ENCOUNTER
patient called, no answer, message with callback number provided... strep A culture with negative results

## 2023-01-26 ENCOUNTER — TELEPHONE (OUTPATIENT)
Dept: URGENT CARE | Facility: CLINIC | Age: 23
End: 2023-01-26
Payer: COMMERCIAL

## 2023-01-27 NOTE — TELEPHONE ENCOUNTER
Patient returned call. She was made aware of neg strep cx. She reports improvement with the azithromycin and plans on finishing the medication. No questions. greg

## 2024-11-14 ENCOUNTER — HOSPITAL ENCOUNTER (EMERGENCY)
Facility: OTHER | Age: 24
Discharge: HOME | End: 2024-11-14
Attending: EMERGENCY MEDICINE
Payer: COMMERCIAL

## 2024-11-14 VITALS
SYSTOLIC BLOOD PRESSURE: 109 MMHG | WEIGHT: 134 LBS | RESPIRATION RATE: 16 BRPM | HEART RATE: 62 BPM | TEMPERATURE: 98 F | HEIGHT: 67 IN | DIASTOLIC BLOOD PRESSURE: 67 MMHG | BODY MASS INDEX: 21.03 KG/M2 | OXYGEN SATURATION: 98 %

## 2024-11-14 DIAGNOSIS — N83.201 CYST OF RIGHT OVARY: Primary | ICD-10-CM

## 2024-11-14 DIAGNOSIS — R10.2 PELVIC PAIN: ICD-10-CM

## 2024-11-14 LAB
ALBUMIN SERPL BCP-MCNC: 4.1 G/DL (ref 3.5–5.2)
ALP SERPL-CCNC: 65 U/L (ref 40–150)
ALT SERPL W/O P-5'-P-CCNC: 11 U/L (ref 10–44)
ANION GAP SERPL CALC-SCNC: 7 MMOL/L (ref 8–16)
AST SERPL-CCNC: 19 U/L (ref 10–40)
B-HCG UR QL: NEGATIVE
BASOPHILS # BLD AUTO: 0.04 K/UL (ref 0–0.2)
BASOPHILS NFR BLD: 0.6 % (ref 0–1.9)
BILIRUB SERPL-MCNC: 0.4 MG/DL (ref 0.1–1)
BILIRUB UR QL STRIP: NEGATIVE
BUN SERPL-MCNC: 15 MG/DL (ref 6–20)
CALCIUM SERPL-MCNC: 9.7 MG/DL (ref 8.7–10.5)
CHLORIDE SERPL-SCNC: 107 MMOL/L (ref 95–110)
CLARITY UR: ABNORMAL
CO2 SERPL-SCNC: 23 MMOL/L (ref 23–29)
COLOR UR: YELLOW
CREAT SERPL-MCNC: 0.8 MG/DL (ref 0.5–1.4)
CREAT SERPL-MCNC: 0.8 MG/DL (ref 0.5–1.4)
CTP QC/QA: YES
DIFFERENTIAL METHOD BLD: NORMAL
EOSINOPHIL # BLD AUTO: 0.1 K/UL (ref 0–0.5)
EOSINOPHIL NFR BLD: 1.3 % (ref 0–8)
ERYTHROCYTE [DISTWIDTH] IN BLOOD BY AUTOMATED COUNT: 11.8 % (ref 11.5–14.5)
EST. GFR  (NO RACE VARIABLE): >60 ML/MIN/1.73 M^2
GLUCOSE SERPL-MCNC: 78 MG/DL (ref 70–110)
GLUCOSE UR QL STRIP: NEGATIVE
HCT VFR BLD AUTO: 41.4 % (ref 37–48.5)
HGB BLD-MCNC: 14.1 G/DL (ref 12–16)
HGB UR QL STRIP: ABNORMAL
IMM GRANULOCYTES # BLD AUTO: 0.01 K/UL (ref 0–0.04)
IMM GRANULOCYTES NFR BLD AUTO: 0.1 % (ref 0–0.5)
KETONES UR QL STRIP: NEGATIVE
LEUKOCYTE ESTERASE UR QL STRIP: NEGATIVE
LIPASE SERPL-CCNC: 65 U/L (ref 4–60)
LYMPHOCYTES # BLD AUTO: 2.5 K/UL (ref 1–4.8)
LYMPHOCYTES NFR BLD: 35.3 % (ref 18–48)
MCH RBC QN AUTO: 30.5 PG (ref 27–31)
MCHC RBC AUTO-ENTMCNC: 34.1 G/DL (ref 32–36)
MCV RBC AUTO: 90 FL (ref 82–98)
MONOCYTES # BLD AUTO: 0.5 K/UL (ref 0.3–1)
MONOCYTES NFR BLD: 7.4 % (ref 4–15)
NEUTROPHILS # BLD AUTO: 4 K/UL (ref 1.8–7.7)
NEUTROPHILS NFR BLD: 55.3 % (ref 38–73)
NITRITE UR QL STRIP: NEGATIVE
NRBC BLD-RTO: 0 /100 WBC
PH UR STRIP: 8 [PH] (ref 5–8)
PLATELET # BLD AUTO: 334 K/UL (ref 150–450)
PMV BLD AUTO: 9.4 FL (ref 9.2–12.9)
POTASSIUM SERPL-SCNC: 4.4 MMOL/L (ref 3.5–5.1)
PROT SERPL-MCNC: 7.4 G/DL (ref 6–8.4)
PROT UR QL STRIP: NEGATIVE
RBC # BLD AUTO: 4.62 M/UL (ref 4–5.4)
SAMPLE: NORMAL
SODIUM SERPL-SCNC: 137 MMOL/L (ref 136–145)
SP GR UR STRIP: 1.02 (ref 1–1.03)
URN SPEC COLLECT METH UR: ABNORMAL
UROBILINOGEN UR STRIP-ACNC: NEGATIVE EU/DL
WBC # BLD AUTO: 7.14 K/UL (ref 3.9–12.7)

## 2024-11-14 PROCEDURE — 85025 COMPLETE CBC W/AUTO DIFF WBC: CPT | Performed by: PHYSICIAN ASSISTANT

## 2024-11-14 PROCEDURE — 80053 COMPREHEN METABOLIC PANEL: CPT | Performed by: PHYSICIAN ASSISTANT

## 2024-11-14 PROCEDURE — 81025 URINE PREGNANCY TEST: CPT | Performed by: PHYSICIAN ASSISTANT

## 2024-11-14 PROCEDURE — 81003 URINALYSIS AUTO W/O SCOPE: CPT | Performed by: PHYSICIAN ASSISTANT

## 2024-11-14 PROCEDURE — 83690 ASSAY OF LIPASE: CPT | Performed by: PHYSICIAN ASSISTANT

## 2024-11-14 PROCEDURE — 99285 EMERGENCY DEPT VISIT HI MDM: CPT | Mod: 25

## 2024-11-14 PROCEDURE — 96374 THER/PROPH/DIAG INJ IV PUSH: CPT

## 2024-11-14 PROCEDURE — 63600175 PHARM REV CODE 636 W HCPCS

## 2024-11-14 RX ORDER — KETOROLAC TROMETHAMINE 30 MG/ML
15 INJECTION, SOLUTION INTRAMUSCULAR; INTRAVENOUS
Status: COMPLETED | OUTPATIENT
Start: 2024-11-14 | End: 2024-11-14

## 2024-11-14 RX ORDER — IBUPROFEN 800 MG/1
800 TABLET ORAL EVERY 6 HOURS PRN
Qty: 20 TABLET | Refills: 0 | Status: SHIPPED | OUTPATIENT
Start: 2024-11-14 | End: 2024-11-19

## 2024-11-14 RX ORDER — OXYCODONE HYDROCHLORIDE 5 MG/1
5 TABLET ORAL EVERY 6 HOURS PRN
Qty: 8 TABLET | Refills: 0 | Status: SHIPPED | OUTPATIENT
Start: 2024-11-14 | End: 2024-11-17

## 2024-11-14 RX ADMIN — KETOROLAC TROMETHAMINE 15 MG: 30 INJECTION, SOLUTION INTRAMUSCULAR; INTRAVENOUS at 02:11

## 2024-11-14 NOTE — DISCHARGE INSTRUCTIONS
You can take ibuprofen and use a heating pad as needed for mild to moderate pelvic pain. Reserve Roxicodone for severe pain only. Follow up with GYN. Referral has been provided.

## 2024-11-14 NOTE — Clinical Note
"Michell Hawkinsa" Clary was seen and treated in our emergency department on 11/14/2024.  She may return to work on 11/14/2024.       If you have any questions or concerns, please don't hesitate to call.      Will S LPN    "

## 2024-11-14 NOTE — ED PROVIDER NOTES
"Encounter Date: 11/14/2024       History     Chief Complaint   Patient presents with    Abdominal Pain     Pt reports sudden onset of constant, sharp/cramping RLQ abdominal pain 1 hour PTA while laying down. Pt denies any n/v, urinary s/s or vaginal bleeding     Michell Means is a 24 y.o. female with history of ovarian cysts presenting to the emergency department for evaluation of right pelvic pain that began 1 hours ago.  She describes the pain as constant,"sharp cramping. " She states the pain feels similar to pain associated with ovarian cyst but not as severe as previous episodes.  She has not taken any medicine for her pain.  She denies fever, chills, cough, cold symptoms, nausea, vomiting, diarrhea, vaginal bleeding, vaginal discharge, or urinary symptoms.  She denies history of abdominal surgeries.  Patient states that she does have an IUD in place.  Notes that she started having ovarian cysts after placement of her 1st IUD in the past.      The history is provided by the patient.     Review of patient's allergies indicates:  No Known Allergies  Past Medical History:   Diagnosis Date    Anemia      History reviewed. No pertinent surgical history.  No family history on file.  Social History     Tobacco Use    Smoking status: Never    Smokeless tobacco: Never   Substance Use Topics    Alcohol use: Not Currently     Review of Systems   Constitutional:  Negative for chills and fever.   HENT:  Negative for congestion, rhinorrhea and sore throat.    Respiratory:  Negative for cough and shortness of breath.    Cardiovascular:  Negative for chest pain.   Gastrointestinal:  Negative for abdominal pain, diarrhea, nausea and vomiting.   Genitourinary:  Positive for pelvic pain. Negative for dysuria, frequency and urgency.   Musculoskeletal:  Negative for back pain.   Skin:  Negative for rash.   Neurological:  Negative for dizziness and headaches.   Psychiatric/Behavioral:  Negative for confusion.        Physical Exam "     Initial Vitals [11/14/24 1338]   BP Pulse Resp Temp SpO2   108/64 72 16 97.5 °F (36.4 °C) 98 %      MAP       --         Physical Exam    Nursing note and vitals reviewed.  Constitutional: She appears well-developed and well-nourished. No distress.   HENT:   Head: Normocephalic and atraumatic.   Nose: Nose normal. Mouth/Throat: Oropharynx is clear and moist.   Eyes: Conjunctivae and EOM are normal.   Neck: Neck supple.   Normal range of motion.  Cardiovascular:  Normal rate, regular rhythm, normal heart sounds and intact distal pulses.           Pulmonary/Chest: Breath sounds normal. No respiratory distress. She has no wheezes. She has no rhonchi. She has no rales.   Abdominal: Abdomen is soft. Bowel sounds are normal. She exhibits no distension and no mass. There is no abdominal tenderness.   No focal abdominal or pelvic tenderness to palpation. There is no rebound and no guarding.   Musculoskeletal:         General: Normal range of motion.      Cervical back: Normal range of motion and neck supple.     Neurological: She is alert and oriented to person, place, and time. She has normal strength.   Skin: Skin is warm and dry.   Psychiatric: She has a normal mood and affect. Her behavior is normal. Judgment and thought content normal.         ED Course   Procedures  Labs Reviewed   COMPREHENSIVE METABOLIC PANEL - Abnormal       Result Value    Sodium 137      Potassium 4.4      Chloride 107      CO2 23      Glucose 78      BUN 15      Creatinine 0.8      Calcium 9.7      Total Protein 7.4      Albumin 4.1      Total Bilirubin 0.4      Alkaline Phosphatase 65      AST 19      ALT 11      eGFR >60      Anion Gap 7 (*)    LIPASE - Abnormal    Lipase 65 (*)    URINALYSIS, REFLEX TO URINE CULTURE - Abnormal    Specimen UA Urine, Clean Catch      Color, UA Yellow      Appearance, UA Hazy (*)     pH, UA 8.0      Specific Gravity, UA 1.020      Protein, UA Negative      Glucose, UA Negative      Ketones, UA Negative       Bilirubin (UA) Negative      Occult Blood UA Trace (*)     Nitrite, UA Negative      Urobilinogen, UA Negative      Leukocytes, UA Negative      Narrative:     Specimen Source->Urine   CBC W/ AUTO DIFFERENTIAL    WBC 7.14      RBC 4.62      Hemoglobin 14.1      Hematocrit 41.4      MCV 90      MCH 30.5      MCHC 34.1      RDW 11.8      Platelets 334      MPV 9.4      Immature Granulocytes 0.1      Gran # (ANC) 4.0      Immature Grans (Abs) 0.01      Lymph # 2.5      Mono # 0.5      Eos # 0.1      Baso # 0.04      nRBC 0      Gran % 55.3      Lymph % 35.3      Mono % 7.4      Eosinophil % 1.3      Basophil % 0.6      Differential Method Automated     POCT URINE PREGNANCY    POC Preg Test, Ur Negative       Acceptable Yes     ISTAT CREATININE    POC Creatinine 0.8      Sample VENOUS            Imaging Results              US Pelvis Comp with Transvag NON-OB (xpd (Final result)  Result time 11/14/24 15:33:04      Final result by Liborio Alberts MD (11/14/24 15:33:04)                   Impression:      Intrauterine device which appears in place.      Electronically signed by: Liborio Alberts  Date:    11/14/2024  Time:    15:33               Narrative:    EXAMINATION:  US PELVIS COMP WITH TRANSVAG NON-OB (XPD)    CLINICAL HISTORY:  pelvic pain;    TECHNIQUE:  Transabdominal sonography of the pelvis was performed, followed by transvaginal sonography to better evaluate the uterus and ovaries.    COMPARISON:  Pelvic ultrasound dated 04/07/2022    FINDINGS:  Uterus:    Size: 7.4 x 3.1 x 5.3 cm    Masses: None    Endometrium: Presence of intrauterine device which appears in place.    Right ovary:    Size: 4.0 x 2.4 x 5.4 cm    Appearance: Anechoic area suggesting cyst measuring 3.6 x 3.5 x 2.0 cm.    Vascular flow: Present    Left ovary:    Size: 2.7 x 2.8 x 2.0 cm    Appearance: Unremarkable    Vascular Flow: Present    Free Fluid:    None significant.                                       Medications    ketorolac injection 15 mg (15 mg Intravenous Given 11/14/24 1418)     Medical Decision Making                        Medical Decision Making:   Initial Assessment:   Urgent evaluation of 24-year-old female with history of ovarian cyst presenting with right pelvic pain that began 1 hour ago.  No other associated symptoms.  On exam, she is well-appearing and nontoxic.  Hemodynamically stable.  Afebrile in the ED. abdomen is soft, nontender, and nondistended.  Plan for labs and an ultrasound.  Will give Toradol and reassess.  I updated  Differential Diagnosis:   Differential diagnosis includes but not limited to UTI, pregnancy, kidney stones, ovarian cyst, uterine fibroids, appendicitis  Clinical Tests:   Lab Tests: Ordered and Reviewed  Radiological Study: Ordered and Reviewed  ED Management:  On review of labs, no leukocytosis.  H&H within normal limits.  Normal platelet count.  CMP is unremarkable.  Lipase is slightly elevated at 65.  She does not have any upper abdominal pain.  Liver enzymes are within normal limits.  UPT is negative.  UA notable for trace blood.  No nitrites or leukocytes.  There is an ovarian cyst on the right.  Intrauterine device is present.  I updated the patient on all results.  Discharged with prescriptions for ibuprofen and few tablets of oxycodone.  Ambulatory referral to gyn was provided.  Patient verbalized understanding and agreement with this plan of care. She was given specific return precautions. Advised to follow up with PCP as needed. All questions and concerns addressed. She is stable for discharge.     This note was created with MModal Fluency Direct Dictation. Please excuse any spelling or grammatical errors.             Clinical Impression:  Final diagnoses:  [R10.2] Pelvic pain  [N83.201] Cyst of right ovary (Primary)          ED Disposition Condition    Discharge Stable          ED Prescriptions       Medication Sig Dispense Start Date End Date Auth. Provider    ibuprofen  (ADVIL,MOTRIN) 800 MG tablet Take 1 tablet (800 mg total) by mouth every 6 (six) hours as needed for Pain. 20 tablet 11/14/2024 11/19/2024 Justin Payan PA-C    oxyCODONE (ROXICODONE) 5 MG immediate release tablet Take 1 tablet (5 mg total) by mouth every 6 (six) hours as needed for Pain. 8 tablet 11/14/2024 11/17/2024 Justin Payan PA-C          Follow-up Information    None          Justin Payan PA-C  11/14/24 1735

## 2024-11-14 NOTE — ED TRIAGE NOTES
Pt c/o sharp belly pain starting today in RLQ. Denying n/v/d, dysuria, fever. States the only other time something like this had happened was when she had some ovarian cysts rupture but that was more severe pain. States pain is tolerable if she crouches or hunches over, very painful when straightening up.

## 2024-11-14 NOTE — FIRST PROVIDER EVALUATION
Emergency Department TeleTriage Encounter Note      CHIEF COMPLAINT    Chief Complaint   Patient presents with    Abdominal Pain     Pt reports sudden onset of constant, sharp/cramping RLQ abdominal pain 1 hour PTA while laying down. Pt denies any n/v, urinary s/s or vaginal bleeding       VITAL SIGNS   Initial Vitals [11/14/24 1338]   BP Pulse Resp Temp SpO2   108/64 72 16 97.5 °F (36.4 °C) 98 %      MAP       --            ALLERGIES    Review of patient's allergies indicates:  No Known Allergies    PROVIDER TRIAGE NOTE  This is a teletriage evaluation of a 24 y.o. female presenting to the ED complaining of abdominal pain. Patient reports RLQ abdominal pain for the past hour. She has nausea, but no vomiting. She denies fever. She has a history of ovarian cysts. She recently had IUD placed and is having irregular periods.    Patient is alert and oriented. She speaks in complete sentences. She is sitting upright in the chair in no distress.     Initial orders will be placed and care will be transferred to an alternate provider when patient is roomed for a full evaluation. Any additional orders and the final disposition will be determined by that provider.         ORDERS  Labs Reviewed   CBC W/ AUTO DIFFERENTIAL   COMPREHENSIVE METABOLIC PANEL   LIPASE   URINALYSIS, REFLEX TO URINE CULTURE   POCT URINE PREGNANCY       ED Orders (720h ago, onward)      Start Ordered     Status Ordering Provider    11/14/24 1345 11/14/24 1344  Vital signs  Every 2 hours         Ordered KINSEY, DOMINIQUE G.    11/14/24 1344 11/14/24 1344  Diet NPO  Diet effective now         Ordered KINSYE, DOIMNIQUE G.    11/14/24 1344 11/14/24 1344  Insert peripheral IV  Once         Ordered KINSEY, DOMINIQUE G.    11/14/24 1344 11/14/24 1344  CBC W/ AUTO DIFFERENTIAL  STAT         Ordered KINSEY, DOMINIQUE G.    11/14/24 1344 11/14/24 1344  Comp. Metabolic Panel  STAT         Ordered KINSEY, DOMINIQUE G.    11/14/24 1344 11/14/24 1344  POCT Venous Blood Gas (Creatinine Only)   Once        Comments: This test should be used for VBGs.  If using this order for other tests (K, creatinine, HCT, PT/INR, lactate etc)  ONLY do so in the case of an emergency or rapid response.Notify Physician if: see parameters below.      Ordered KINSEY, DOMINIQUE G.    11/14/24 1344 11/14/24 1344  Lipase  STAT         Ordered KINSEY, DOMINIQUE G.    11/14/24 1344 11/14/24 1344  Urinalysis, Reflex to Urine Culture Urine, Clean Catch  STAT         Ordered KINSEY, DOMINIQUE G.    11/14/24 1344 11/14/24 1344  POCT urine pregnancy  Once         Ordered KINSEY, DOMINIQUE G.    11/14/24 1344 11/14/24 1344  CT Abdomen Pelvis With IV Contrast NO Oral Contrast  1 time imaging         Ordered KINSEY, DOMINIQUE G.              Virtual Visit Note: The provider triage portion of this emergency department evaluation and documentation was performed via [employer], a HIPAA-compliant telemedicine application, in concert with a tele-presenter in the room. A face to face patient evaluation with one of my colleagues will occur once the patient is placed in an emergency department room.      DISCLAIMER: This note was prepared with [employer] voice recognition transcription software. Garbled syntax, mangled pronouns, and other bizarre constructions may be attributed to that software system.

## 2024-11-22 ENCOUNTER — OFFICE VISIT (OUTPATIENT)
Dept: OBSTETRICS AND GYNECOLOGY | Facility: CLINIC | Age: 24
End: 2024-11-22
Payer: COMMERCIAL

## 2024-11-22 VITALS
SYSTOLIC BLOOD PRESSURE: 120 MMHG | WEIGHT: 136.25 LBS | BODY MASS INDEX: 21.9 KG/M2 | DIASTOLIC BLOOD PRESSURE: 74 MMHG | HEIGHT: 66 IN

## 2024-11-22 DIAGNOSIS — R10.2 PELVIC PAIN: ICD-10-CM

## 2024-11-22 DIAGNOSIS — N83.201 CYST OF RIGHT OVARY: ICD-10-CM

## 2024-11-22 PROCEDURE — 99999 PR PBB SHADOW E&M-EST. PATIENT-LVL III: CPT | Mod: PBBFAC,,, | Performed by: NURSE PRACTITIONER

## 2024-11-22 NOTE — PROGRESS NOTES
"Chief Complaint: ER Follow-up     HPI:      Michell is a 24 y.o. No obstetric history on file. who presents today for ER f/u. She was seen in the ER on 11/14 d/t pelvic pain       Pelvic u/s -   Appearance: Anechoic area suggesting cyst measuring 3.6 x 3.5 x 2.0 cm.     She is not currently having pelvic pain. She has a history of a ruptured ovarian cyst 4 years ago. She is questioning relation between IUD and ovarian cysts.   She also has questions about PCOS.    Michell  is currently sexually active . She is currently using Kyleena inserted in 9/25/2024 for contraception.     Previous Pap: Negative per patient (No result found)    Gardasil:Completed     Past Medical History:   Diagnosis Date    Anemia        Current Outpatient Medications:     diphenhydrAMINE-aluminum-magnesium hydroxide-simethicone-LIDOcaine HCl 2%, Swish and spit 15 mLs every 4 (four) hours as needed (throat pain). (Patient not taking: Reported on 11/22/2024), Disp: 120 each, Rfl: 0   Review of patient's allergies indicates:  No Known Allergies  History reviewed. No pertinent surgical history.  Social History     Tobacco Use    Smoking status: Never    Smokeless tobacco: Never   Substance Use Topics    Alcohol use: Not Currently     No family history on file.    Physical Exam:      PHYSICAL EXAM:  /74   Ht 5' 6" (1.676 m)   Wt 61.8 kg (136 lb 3.9 oz)   BMI 21.99 kg/m²   Body mass index is 21.99 kg/m².     APPEARANCE: Well nourished, well developed, in no acute distress.      Assessment/Plan:     Pelvic pain  -     Ambulatory referral/consult to Gynecology    Cyst of right ovary  -     Ambulatory referral/consult to Gynecology        PLAN:    Seeing that ovarian cyst is overall simple appearing, <5 cm and she is asymptomatic, ORADS 2 lesion (almost certainly benign, <1%) and no further management recommended in premenopausal patient  Discussed association between IUDs and ovarian cysts; however, it is not an indication to remove IUD unless " desired by patient   Brief discussion on PCOS and diagnosis based on meeting 2 of 3 [place] criteria     Follow up if symptoms worsen or fail to improve.

## 2024-11-22 NOTE — LETTER
November 22, 2024      [facility]  [address]  [facility] [address]  Phone: [phone]       Patient: Michell Means   YOB: 2000  Date of Visit: 11/22/2024    To Whom It May Concern:    Emy Means  was at [facility] on 11/22/2024. Please excuse her from school. If you have any questions or concerns, or if I can be of further assistance, please do not hesitate to contact me.    Sincerely,      Елена Callahan NP